# Patient Record
Sex: MALE | Race: WHITE | NOT HISPANIC OR LATINO | Employment: FULL TIME | URBAN - NONMETROPOLITAN AREA
[De-identification: names, ages, dates, MRNs, and addresses within clinical notes are randomized per-mention and may not be internally consistent; named-entity substitution may affect disease eponyms.]

---

## 2020-10-30 DIAGNOSIS — M25.561 RIGHT KNEE PAIN, UNSPECIFIED CHRONICITY: ICD-10-CM

## 2020-10-30 DIAGNOSIS — M25.562 LEFT KNEE PAIN, UNSPECIFIED CHRONICITY: Primary | ICD-10-CM

## 2020-11-02 ENCOUNTER — OFFICE VISIT (OUTPATIENT)
Dept: ORTHOPEDIC SURGERY | Facility: CLINIC | Age: 40
End: 2020-11-02

## 2020-11-02 ENCOUNTER — HOSPITAL ENCOUNTER (OUTPATIENT)
Dept: OTHER | Facility: HOSPITAL | Age: 40
Discharge: HOME | End: 2020-11-02
Attending: PHYSICIAN ASSISTANT

## 2020-11-02 VITALS — HEIGHT: 67 IN | WEIGHT: 165 LBS | BODY MASS INDEX: 25.9 KG/M2

## 2020-11-02 DIAGNOSIS — M17.0 BILATERAL PRIMARY OSTEOARTHRITIS OF KNEE: Primary | ICD-10-CM

## 2020-11-02 PROCEDURE — 99204 OFFICE O/P NEW MOD 45 MIN: CPT | Performed by: PHYSICIAN ASSISTANT

## 2020-11-02 RX ORDER — MELOXICAM 7.5 MG/1
TABLET ORAL
Qty: 90 TABLET | Refills: 1 | Status: SHIPPED | OUTPATIENT
Start: 2020-11-02 | End: 2021-05-03

## 2020-11-02 NOTE — PROGRESS NOTES
NEW VISIT    Patient: Theo Ching  ?  YOB: 1980    MRN: [id number]  ?  Chief Complaint   Patient presents with   • Right Knee - Pain, Establish Care   • Left Knee - Pain, Establish Care      ?  HPI:   Theo Ching is a 39 y.o. male who presents with complaint of bilateral knee pain. He reports the pain has been present for 6-7 months. He denies injury. He is employed with [employer] in [place] and is also going through a [employer] program at the local [employer], both of which require prolonged standing.    Pain Location:  BILATERAL knee, right worse than left,  medially  Radiation: none  Quality: aching  Intensity/Severity: moderate  Duration: 6-7 months  Progression of symptoms: no worsening, symptoms stable/unchanged  Onset quality: gradual   Timing: constant  Aggravating Factors: driving, walking, walking down incline, squatting  Alleviating Factors: none  Previous Episodes: yes  Associated Symptoms: pain, popping  ADLs Affected: ambulating, work related activities, recreational activities/sports  Previous Treatment: NSAIDs and brace      This patient is a new patient.  This problem is new to this examiner.      Allergies: No Known Allergies    Medications:   Home Medications:  No current outpatient medications on file prior to visit.     No current facility-administered medications on file prior to visit.      Current Medications:  Scheduled Meds:  PRN Meds:.    I have reviewed the patient's medical history in detail and updated the computerized patient record.  Review and summarization of old records include:    Past Medical History:   Diagnosis Date   • Allergies      Past Surgical History:   Procedure Laterality Date   • MOUTH SURGERY       Social History     Occupational History   • Not on file   Tobacco Use   • Smoking status: Current Some Day Smoker     Types: Cigars   • Smokeless tobacco: Never Used   • Tobacco comment: 2/week   Substance and Sexual Activity  "  • Alcohol use: Not on file   • Drug use: Never   • Sexual activity: Defer     Family History   Problem Relation Age of Onset   • Cancer Mother         breast   • Hypertension Mother    • Diabetes Mother    • Hyperlipidemia Father          Review of Systems  Constitutional: Negative.  Negative for fever.   Eyes: Negative.    Respiratory: Negative.    Cardiovascular: Negative.    Endocrine: Negative.    Musculoskeletal: Positive for arthralgias, gait problem and joint swelling.   Skin: Negative.  Negative for rash and wound.   Allergic/Immunologic: Negative.    Neurological: Negative for numbness.   Hematological: Negative.    Psychiatric/Behavioral: Negative.         Wt Readings from Last 3 Encounters:   11/02/20 74.8 kg (165 lb)     Ht Readings from Last 3 Encounters:   11/02/20 170.2 cm (67\")     Body mass index is 25.84 kg/m².  Facility age limit for growth percentiles is 20 years.  There were no vitals filed for this visit.      Physical Exam  Constitutional: Patient is oriented to person, place, and time. Appears well-developed and well-nourished.   HENT:   Head: Normocephalic and atraumatic.   Eyes: Conjunctivae and EOM are normal.   Cardiovascular: Normal rate and intact distal pulses.   Pulmonary/Chest: Effort normal.   Musculoskeletal:   See detailed exam below   Neurological: Alert and oriented to person, place, and time. No sensory deficit. Coordination normal.   Skin: Skin is warm and dry. Capillary refill takes less than 2 seconds. No rash noted. No erythema.   Psychiatric: Patient has a normal mood and affect. His behavior is normal. Judgment and thought content normal.   Nursing note and vitals reviewed.      Ortho Exam:   BILATERAL knee:  · There is minimal joint line tenderness at the medial aspect of the knee.   · The knees have a fairly neutral orientation.   · Positive for crepitus throughout range of motion.   · Negative for effusion.  · Positive patellar grind test.   · Negative Lachman test.  "   · Negative anterior and posterior drawer.  · Range of motion in extension and flexion is: 0-120 degrees.  · Neurovascular status is intact.    · Dorsalis pedis and posterior tibial artery pulses are palpable.   · Common peroneal nerve function is well preserved.   · Gait is cautious and antalgic.        Diagnostics:  Bilateral knee xrays 3 views were ordered by Osiel Acosta PA-C and performed at [facility] on 11/2/2020. These images were independently viewed and interpreted by myself, my impression as follows:  Findings:   Left knee: Mild to moderate medial joint space narrowing, lateral joint space fairly preserved, mild patellofemoral changes  Right knee: Moderate medial joint space narrowing which appears to be near bone-on-bone on the oblique view, lateral joint space fairly preserved, mild changes of the patellofemoral  Bony lesion: no  Soft tissues: within normal limits  Joint spaces: decreased  Hardware appropriately positioned: not applicable  Prior studies available for comparison: no         Assessment:  Diagnoses and all orders for this visit:    1. Bilateral primary osteoarthritis of knee (Primary)  -     meloxicam (MOBIC) 7.5 MG tablet; 1 Oral Daily with food.  Dispense: 90 tablet; Refill: 1  -     Visco Treatment; Future      MDM  Number of Diagnoses or Management Options  Bilateral primary osteoarthritis of knee: new, no workup     Amount and/or Complexity of Data Reviewed  Tests in the radiology section of CPT®: ordered and reviewed    Risk of Complications, Morbidity, and/or Mortality  Presenting problems: moderate  Management options: moderate            Plan    Orders Placed This Encounter   Procedures   • Visco Treatment      · Management of chronic condition with acute exacerbation or progression of symptoms   · Discussion of orthopaedic goals and activities.  · Risk, benefits, and merits of treatment alternatives reviewed with the patient. Discussed oral  anti-inflammatories, bracing, intra-articular steroid injections, intra-articular viscosupplementation, MRI.  After discussion he would like to proceed with oral anti-inflammatory, Mobic, and get insurance approval for viscosupplementation.  We discussed we could do steroid injections if needed although since he is young I do not want to do them as frequently as I do with my older patients.  · Ice, heat, and/or modalities as beneficial  · Patient is encouraged to call or return for any issues or concerns.  · No brace was given at today's visit.  · Follow up to be determined once visco approval determined      Osiel Acosta PA-C  Date of encounter: 11/02/2020     Electronically signed by Osiel Acosta PA-C, 11/02/20, 7:27 AM EST.    [facility] Dragon/Transcription disclaimer:  Much of this encounter note is an electronic transcription/translation of spoken language to printed text. The electronic translation of spoken language may permit erroneous, or at times, nonsensical words or phrases to be inadvertently transcribed; Although I have reviewed the note for such errors, some may still exist.

## 2020-11-10 ENCOUNTER — HOSPITAL ENCOUNTER (OUTPATIENT)
Dept: OTHER | Facility: HOSPITAL | Age: 40
Discharge: HOME | End: 2020-11-10
Attending: PHYSICIAN ASSISTANT

## 2020-11-10 ENCOUNTER — RESULTS ENCOUNTER (OUTPATIENT)
Dept: ORTHOPEDIC SURGERY | Facility: CLINIC | Age: 40
End: 2020-11-10

## 2020-11-10 ENCOUNTER — OFFICE VISIT (OUTPATIENT)
Dept: ORTHOPEDIC SURGERY | Facility: CLINIC | Age: 40
End: 2020-11-10

## 2020-11-10 VITALS — BODY MASS INDEX: 26.37 KG/M2 | WEIGHT: 168 LBS | HEIGHT: 67 IN | TEMPERATURE: 97.6 F

## 2020-11-10 DIAGNOSIS — M25.50 POLYARTHRALGIA: ICD-10-CM

## 2020-11-10 DIAGNOSIS — M25.561 RIGHT KNEE PAIN, UNSPECIFIED CHRONICITY: ICD-10-CM

## 2020-11-10 DIAGNOSIS — M25.562 LEFT KNEE PAIN, UNSPECIFIED CHRONICITY: ICD-10-CM

## 2020-11-10 DIAGNOSIS — Z86.19: Primary | ICD-10-CM

## 2020-11-10 DIAGNOSIS — Z86.19: ICD-10-CM

## 2020-11-10 LAB
ALBUMIN SERPL-MCNC: 4.9 G/DL (ref 3.5–5)
ALBUMIN/GLOB SERPL: 2 {RATIO} (ref 1.4–2.6)
ALP SERPL-CCNC: 58 U/L (ref 53–128)
ALT SERPL-CCNC: 34 U/L (ref 10–40)
ANION GAP SERPL CALC-SCNC: 17 MMOL/L (ref 8–19)
ASO AB SERPL-ACNC: 84 [IU]/ML (ref 0–200)
AST SERPL-CCNC: 26 U/L (ref 15–50)
BASOPHILS # BLD MANUAL: 0.05 10*3/UL (ref 0–0.2)
BASOPHILS NFR BLD MANUAL: 0.6 % (ref 0–3)
BILIRUB SERPL-MCNC: 0.38 MG/DL (ref 0.2–1.3)
BUN SERPL-MCNC: 14 MG/DL (ref 5–25)
BUN/CREAT SERPL: 16 {RATIO} (ref 6–20)
CALCIUM SERPL-MCNC: 9.7 MG/DL (ref 8.7–10.4)
CHLORIDE SERPL-SCNC: 104 MMOL/L (ref 99–111)
CONV CO2: 22 MMOL/L (ref 22–32)
CONV RHEUMATOID FACTOR IGM: <10 [IU]/ML (ref 0–14)
CONV TOTAL PROTEIN: 7.3 G/DL (ref 6.3–8.2)
CREAT UR-MCNC: 0.87 MG/DL (ref 0.7–1.2)
CRP SERPL-MCNC: 1.9 MG/L (ref 0–5)
DEPRECATED RDW RBC AUTO: 38.8 FL
EOSINOPHIL # BLD MANUAL: 0.25 10*3/UL (ref 0–0.7)
EOSINOPHIL NFR BLD MANUAL: 3.1 % (ref 0–7)
ERYTHROCYTE [DISTWIDTH] IN BLOOD BY AUTOMATED COUNT: 11.7 % (ref 11.5–14.5)
GFR SERPLBLD BASED ON 1.73 SQ M-ARVRAT: >60 ML/MIN/{1.73_M2}
GLOBULIN UR ELPH-MCNC: 2.4 G/DL (ref 2–3.5)
GLUCOSE SERPL-MCNC: 102 MG/DL (ref 70–99)
GRANS (ABSOLUTE): 5.43 10*3/UL (ref 2–8)
GRANS: 66.5 % (ref 30–85)
HBA1C MFR BLD: 15.7 G/DL (ref 14–18)
HCT VFR BLD AUTO: 44.7 % (ref 42–52)
IMM GRANULOCYTES # BLD: 0.01 10*3/UL (ref 0–0.54)
IMM GRANULOCYTES NFR BLD: 0.1 % (ref 0–0.43)
LYMPHOCYTES # BLD MANUAL: 1.76 10*3/UL (ref 1–5)
LYMPHOCYTES NFR BLD MANUAL: 8.1 % (ref 3–10)
MCH RBC QN AUTO: 31.2 PG (ref 27–31)
MCHC RBC AUTO-ENTMCNC: 35.1 G/DL (ref 33–37)
MCV RBC AUTO: 88.9 FL (ref 80–96)
MONOCYTES # BLD AUTO: 0.66 10*3/UL (ref 0.2–1.2)
OSMOLALITY SERPL CALC.SUM OF ELEC: 289 MOSM/KG (ref 273–304)
PLATELET # BLD AUTO: 282 10*3/UL (ref 130–400)
PMV BLD AUTO: 9.2 FL (ref 7.4–10.4)
POTASSIUM SERPL-SCNC: 4.1 MMOL/L (ref 3.5–5.3)
RBC # BLD AUTO: 5.03 10*6/UL (ref 4.7–6.1)
SODIUM SERPL-SCNC: 139 MMOL/L (ref 135–147)
URATE SERPL-MCNC: 5.2 MG/DL (ref 3.5–8.5)
VARIANT LYMPHS NFR BLD MANUAL: 21.6 % (ref 20–45)
WBC # BLD AUTO: 8.16 10*3/UL (ref 4.8–10.8)

## 2020-11-10 PROCEDURE — 20610 DRAIN/INJ JOINT/BURSA W/O US: CPT | Performed by: PHYSICIAN ASSISTANT

## 2020-11-10 PROCEDURE — 99214 OFFICE O/P EST MOD 30 MIN: CPT | Performed by: PHYSICIAN ASSISTANT

## 2020-11-10 RX ORDER — CETIRIZINE HYDROCHLORIDE 10 MG/1
10 TABLET ORAL DAILY
COMMUNITY

## 2020-11-10 RX ADMIN — METHYLPREDNISOLONE ACETATE 160 MG: 80 INJECTION, SUSPENSION INTRA-ARTICULAR; INTRALESIONAL; INTRAMUSCULAR; SOFT TISSUE at 09:39

## 2020-11-10 RX ADMIN — METHYLPREDNISOLONE ACETATE 160 MG: 80 INJECTION, SUSPENSION INTRA-ARTICULAR; INTRALESIONAL; INTRAMUSCULAR; SOFT TISSUE at 09:49

## 2020-11-11 LAB
DSDNA AB SER-ACNC: <1 IU/ML (ref 0–9)
ENA RNP AB SER-ACNC: 1 AI (ref 0–0.9)
ENA SM AB SER-ACNC: <0.2 AI (ref 0–0.9)
ENA SS-B AB SER-ACNC: <0.2 AI (ref 0–0.9)
SJOGREN'S ANTI-SS-A: <0.2 AI (ref 0–0.9)

## 2020-11-13 LAB — CONV ANA IGG BY ELISA: NORMAL

## 2020-11-20 RX ORDER — METHYLPREDNISOLONE ACETATE 80 MG/ML
160 INJECTION, SUSPENSION INTRA-ARTICULAR; INTRALESIONAL; INTRAMUSCULAR; SOFT TISSUE
Status: COMPLETED | OUTPATIENT
Start: 2020-11-10 | End: 2020-11-10

## 2020-11-20 NOTE — PROGRESS NOTES
FOLLOW UP VISIT    Patient: Theo Ching  ?  YOB: 1980    MRN: [id number]  ?  Chief Complaint   Patient presents with   • Right Knee - Pain   • Left Knee - Pain      ?  HPI:   Theo Ching is a 39 y.o. male who returns today for follow up on bilateral knee pain. I initially saw him 11/2/20 and started him on Mobic. He states his knees had seemed to be doing better but after returning to work the pain significantly worsened. He reports having difficult time with walking short or long distance. He does report strep throat infection within the last few months.    Pain Location:  BILATERAL knee, right worse than left,  medially  Radiation: none  Quality: aching  Intensity/Severity: moderate severe  Duration: 7 months  Progression of symptoms: yes, progressive worsening  Onset quality: gradual   Timing: constant  Aggravating Factors: driving, walking, walking down incline, squatting  Alleviating Factors: none  Previous Episodes: yes  Associated Symptoms: pain, popping  ADLs Affected: ambulating, work related activities, recreational activities/sports  Previous Treatment: NSAIDs and brace  The above is historical although it has been reviewed and updated to reflect current findings.    This patient is an established patient.  This problem is not new to this examiner.      Allergies: No Known Allergies    Medications:   Home Medications:  Current Outpatient Medications on File Prior to Visit   Medication Sig   • cetirizine (zyrTEC) 10 MG tablet Take 10 mg by mouth Daily.   • meloxicam (MOBIC) 7.5 MG tablet 1 Oral Daily with food.     No current facility-administered medications on file prior to visit.      Current Medications:  Scheduled Meds:  PRN Meds:.    I have reviewed the patient's medical history in detail and updated the computerized patient record.  Review and summarization of old records include:    Past Medical History:   Diagnosis Date   • Allergies      Past Surgical History:   Procedure  "Laterality Date   • MOUTH SURGERY       Social History     Occupational History   • Not on file   Tobacco Use   • Smoking status: Current Some Day Smoker     Types: Cigars   • Smokeless tobacco: Never Used   • Tobacco comment: 2/week   Substance and Sexual Activity   • Alcohol use: Yes     Alcohol/week: 8.0 standard drinks     Types: 8 Cans of beer per week   • Drug use: Never   • Sexual activity: Defer     Family History   Problem Relation Age of Onset   • Cancer Mother         breast   • Hypertension Mother    • Diabetes Mother    • Hyperlipidemia Father          Review of Systems  Constitutional: Negative.  Negative for fever.   Respiratory: Negative.    Cardiovascular: Negative.    Endocrine: Negative.    Musculoskeletal: Positive for arthralgias, gait problem and joint swelling.   Skin: Negative.  Negative for rash and wound.   Allergic/Immunologic: Negative.    Neurological: Negative for numbness.   Hematological: Negative.    Psychiatric/Behavioral: Negative.         Wt Readings from Last 3 Encounters:   11/10/20 76.2 kg (168 lb)   11/02/20 74.8 kg (165 lb)     Ht Readings from Last 3 Encounters:   11/10/20 170.2 cm (67\")   11/02/20 170.2 cm (67\")     Body mass index is 26.31 kg/m².  Facility age limit for growth percentiles is 20 years.  Vitals:    11/10/20 0913   Temp: 97.6 °F (36.4 °C)         Physical Exam  Constitutional: Patient is oriented to person, place, and time. Appears well-developed and well-nourished.   Cardiovascular: Normal rate and intact distal pulses.   Pulmonary/Chest: Effort normal.   Musculoskeletal:   See detailed exam below   Neurological: Alert and oriented to person, place, and time. No sensory deficit. Coordination normal.   Skin: Skin is warm and dry. Capillary refill takes less than 2 seconds. No rash noted. No erythema.   Psychiatric: Patient has a normal mood and affect. His behavior is normal. Judgment and thought content normal.   Nursing note and vitals reviewed.      Ortho " Exam:   BILATERAL knee:  · There is moderate joint line tenderness at the medial aspect of both knees.   · The knees have a fairly neutral orientation.   · Positive for crepitus throughout range of motion.   · Negative for effusion.  · Positive patellar grind test.   · Negative Lachman test.    · Negative anterior and posterior drawer.  · Range of motion in extension and flexion is: 0-120 degrees.  · Neurovascular status is intact.    · Dorsalis pedis and posterior tibial artery pulses are palpable.   · Common peroneal nerve function is well preserved.   · Gait is cautious and antalgic.        Diagnostics:  Rheumatology lab panel ordered + ASO    Large Joint Arthrocentesis: R knee  Date/Time: 11/10/2020 9:39 AM  Consent given by: patient  Site marked: site marked  Timeout: Immediately prior to procedure a time out was called to verify the correct patient, procedure, equipment, support staff and site/side marked as required   Supporting Documentation  Indications: pain   Procedure Details  Location: knee - R knee  Preparation: Patient was prepped and draped in the usual sterile fashion  Needle size: 25 G  Approach: anteromedial  Medications administered: 160 mg methylPREDNISolone acetate 80 MG/ML; 2 mL lidocaine (cardiac)  Patient tolerance: patient tolerated the procedure well with no immediate complications    Large Joint Arthrocentesis: L knee  Date/Time: 11/10/2020 9:49 AM  Consent given by: patient  Site marked: site marked  Timeout: Immediately prior to procedure a time out was called to verify the correct patient, procedure, equipment, support staff and site/side marked as required   Supporting Documentation  Indications: pain   Procedure Details  Location: knee - L knee  Preparation: Patient was prepped and draped in the usual sterile fashion  Needle size: 25 G  Approach: anteromedial  Medications administered: 160 mg methylPREDNISolone acetate 80 MG/ML; 2 mL lidocaine (cardiac)  Patient tolerance: patient  tolerated the procedure well with no immediate complications           Assessment:  Diagnoses and all orders for this visit:    1. History of illness due to group B Streptococcus (Primary)  -     Antistreptolysin O (ASO) Screen; Future    2. Right knee pain, unspecified chronicity  -     Nuclear Antigen Antibody, IFA; Future  -     C-reactive Protein; Future  -     Sedimentation Rate; Future  -     Rheumatoid Factor; Future  -     Anti-Smith Antibody; Future  -     Anti-DNA Antibody, Double-stranded; Future  -     RNP Antibodies; Future  -     Sjogren's Antibody, Anti-SS-A / -SS-B; Future  -     Uric Acid; Future  -     CBC & Differential; Future  -     Comprehensive Metabolic Panel; Future  -     Antistreptolysin O (ASO) Screen; Future  -     Comprehensive Metabolic Panel  -     CBC & Differential  -     Sjogren's Antibody, Anti-SS-A / -SS-B  -     RNP Antibodies  -     Anti-DNA Antibody, Double-stranded  -     Anti-Smith Antibody  -     Rheumatoid Factor  -     Nuclear Antigen Antibody, IFA  -     Large Joint Arthrocentesis: R knee    3. Left knee pain, unspecified chronicity  -     Nuclear Antigen Antibody, IFA; Future  -     C-reactive Protein; Future  -     Sedimentation Rate; Future  -     Rheumatoid Factor; Future  -     Anti-Smith Antibody; Future  -     Anti-DNA Antibody, Double-stranded; Future  -     RNP Antibodies; Future  -     Sjogren's Antibody, Anti-SS-A / -SS-B; Future  -     Uric Acid; Future  -     CBC & Differential; Future  -     Comprehensive Metabolic Panel; Future  -     Antistreptolysin O (ASO) Screen; Future  -     Comprehensive Metabolic Panel  -     CBC & Differential  -     Sjogren's Antibody, Anti-SS-A / -SS-B  -     RNP Antibodies  -     Anti-DNA Antibody, Double-stranded  -     Anti-Smith Antibody  -     Rheumatoid Factor  -     Nuclear Antigen Antibody, IFA  -     Large Joint Arthrocentesis: L knee    4. Polyarthralgia  -     Nuclear Antigen Antibody, IFA; Future  -     C-reactive  Protein; Future  -     Sedimentation Rate; Future  -     Rheumatoid Factor; Future  -     Anti-Smith Antibody; Future  -     Anti-DNA Antibody, Double-stranded; Future  -     RNP Antibodies; Future  -     Sjogren's Antibody, Anti-SS-A / -SS-B; Future  -     Uric Acid; Future  -     CBC & Differential; Future  -     Comprehensive Metabolic Panel; Future  -     Antistreptolysin O (ASO) Screen; Future  -     Comprehensive Metabolic Panel  -     CBC & Differential  -     Sjogren's Antibody, Anti-SS-A / -SS-B  -     RNP Antibodies  -     Anti-DNA Antibody, Double-stranded  -     Anti-Smith Antibody  -     Rheumatoid Factor  -     Nuclear Antigen Antibody, IFA    Other orders  -     Cancel: SCANNED - LABS        Plan    Orders Placed This Encounter   Procedures   • Large Joint Arthrocentesis: R knee   • Large Joint Arthrocentesis: L knee   • Nuclear Antigen Antibody, IFA   • C-reactive Protein   • Sedimentation Rate   • Rheumatoid Factor   • Anti-Smith Antibody   • Anti-DNA Antibody, Double-stranded   • RNP Antibodies   • Sjogren's Antibody, Anti-SS-A / -SS-B   • Uric Acid   • Comprehensive Metabolic Panel   • Antistreptolysin O (ASO) Screen   • CBC & Differential      · Management of chronic condition with acute exacerbation or progression of symptoms   · Discussion of orthopaedic goals and activities.  · Risk, benefits, and merits of treatment alternatives reviewed with the patient. Discussed trying an intra-articular steroid injection today to obtain pain relief and we will continue to work on visco approval.  · Injected patient's bilateral knee joint(s) with steroid from a(n) anteromedial approach.  Patient tolerated the procedure well and no complications were encountered.   · Ice, heat, and/or modalities as beneficial  · Patient is encouraged to call or return for any issues or concerns.  · No brace was given at today's visit.   · I will notify him about lab results      Osiel Acosta PA-C  Date of encounter:  11/10/2020     Electronically signed by Osiel Acosta PA-C, 11/20/20, 1:48 PM EST.   [facility] Dragon/Transcription disclaimer:  Much of this encounter note is an electronic transcription/translation of spoken language to printed text. The electronic translation of spoken language may permit erroneous, or at times, nonsensical words or phrases to be inadvertently transcribed; Although I have reviewed the note for such errors, some may still exist.

## 2021-02-09 ENCOUNTER — OFFICE VISIT (OUTPATIENT)
Dept: ORTHOPEDIC SURGERY | Facility: CLINIC | Age: 41
End: 2021-02-09

## 2021-02-09 VITALS — HEIGHT: 67 IN | WEIGHT: 160 LBS | TEMPERATURE: 97.4 F | BODY MASS INDEX: 25.11 KG/M2

## 2021-02-09 DIAGNOSIS — M25.561 RIGHT KNEE PAIN, UNSPECIFIED CHRONICITY: Primary | ICD-10-CM

## 2021-02-09 DIAGNOSIS — M25.562 LEFT KNEE PAIN, UNSPECIFIED CHRONICITY: ICD-10-CM

## 2021-02-09 PROCEDURE — 99212 OFFICE O/P EST SF 10 MIN: CPT | Performed by: PHYSICIAN ASSISTANT

## 2021-05-02 DIAGNOSIS — M17.0 BILATERAL PRIMARY OSTEOARTHRITIS OF KNEE: ICD-10-CM

## 2021-05-03 RX ORDER — MELOXICAM 7.5 MG/1
TABLET ORAL
Qty: 90 TABLET | Refills: 1 | Status: SHIPPED | OUTPATIENT
Start: 2021-05-03 | End: 2021-11-24

## 2021-11-24 DIAGNOSIS — M17.0 BILATERAL PRIMARY OSTEOARTHRITIS OF KNEE: ICD-10-CM

## 2021-11-24 RX ORDER — MELOXICAM 7.5 MG/1
TABLET ORAL
Qty: 90 TABLET | Refills: 1 | Status: SHIPPED | OUTPATIENT
Start: 2021-11-24 | End: 2022-06-22

## 2022-06-22 DIAGNOSIS — M17.0 BILATERAL PRIMARY OSTEOARTHRITIS OF KNEE: ICD-10-CM

## 2022-06-22 RX ORDER — MELOXICAM 7.5 MG/1
TABLET ORAL
Qty: 90 TABLET | Refills: 1 | Status: SHIPPED | OUTPATIENT
Start: 2022-06-22

## 2024-01-11 ENCOUNTER — APPOINTMENT (OUTPATIENT)
Dept: CT IMAGING | Facility: HOSPITAL | Age: 44
End: 2024-01-11
Payer: COMMERCIAL

## 2024-01-11 ENCOUNTER — HOSPITAL ENCOUNTER (EMERGENCY)
Facility: HOSPITAL | Age: 44
Discharge: HOME | End: 2024-01-11
Attending: EMERGENCY MEDICINE
Payer: COMMERCIAL

## 2024-01-11 VITALS
BODY MASS INDEX: 25.16 KG/M2 | TEMPERATURE: 97.6 F | OXYGEN SATURATION: 98 % | RESPIRATION RATE: 18 BRPM | DIASTOLIC BLOOD PRESSURE: 111 MMHG | HEART RATE: 70 BPM | HEIGHT: 67 IN | WEIGHT: 160.27 LBS | SYSTOLIC BLOOD PRESSURE: 160 MMHG

## 2024-01-11 DIAGNOSIS — N20.1 RIGHT URETERAL STONE: Primary | ICD-10-CM

## 2024-01-11 LAB
ALBUMIN SERPL-MCNC: 4.6 G/DL (ref 3.5–5.2)
ALBUMIN/GLOB SERPL: 1.8 G/DL
ALP SERPL-CCNC: 64 U/L (ref 39–117)
ALT SERPL W P-5'-P-CCNC: 23 U/L (ref 1–41)
ANION GAP SERPL CALCULATED.3IONS-SCNC: 12 MMOL/L (ref 5–15)
AST SERPL-CCNC: 19 U/L (ref 1–40)
BACTERIA UR QL AUTO: ABNORMAL /HPF
BASOPHILS # BLD AUTO: 0.05 10*3/MM3 (ref 0–0.2)
BASOPHILS NFR BLD AUTO: 0.4 % (ref 0–1.5)
BILIRUB SERPL-MCNC: 0.2 MG/DL (ref 0–1.2)
BILIRUB UR QL STRIP: NEGATIVE
BUN SERPL-MCNC: 19 MG/DL (ref 6–20)
BUN/CREAT SERPL: 19.2 (ref 7–25)
CALCIUM SPEC-SCNC: 9.6 MG/DL (ref 8.6–10.5)
CHLORIDE SERPL-SCNC: 105 MMOL/L (ref 98–107)
CLARITY UR: CLEAR
CO2 SERPL-SCNC: 24 MMOL/L (ref 22–29)
COLOR UR: YELLOW
CREAT SERPL-MCNC: 0.99 MG/DL (ref 0.76–1.27)
DEPRECATED RDW RBC AUTO: 39.9 FL (ref 37–54)
EGFRCR SERPLBLD CKD-EPI 2021: 98.1 ML/MIN/1.73
EOSINOPHIL # BLD AUTO: 0.12 10*3/MM3 (ref 0–0.4)
EOSINOPHIL NFR BLD AUTO: 0.8 % (ref 0.3–6.2)
ERYTHROCYTE [DISTWIDTH] IN BLOOD BY AUTOMATED COUNT: 12.1 % (ref 12.3–15.4)
GLOBULIN UR ELPH-MCNC: 2.6 GM/DL
GLUCOSE SERPL-MCNC: 101 MG/DL (ref 65–99)
GLUCOSE UR STRIP-MCNC: NEGATIVE MG/DL
HCT VFR BLD AUTO: 44.9 % (ref 37.5–51)
HGB BLD-MCNC: 15 G/DL (ref 13–17.7)
HGB UR QL STRIP.AUTO: ABNORMAL
HOLD SPECIMEN: NORMAL
HOLD SPECIMEN: NORMAL
HYALINE CASTS UR QL AUTO: ABNORMAL /LPF
IMM GRANULOCYTES # BLD AUTO: 0.06 10*3/MM3 (ref 0–0.05)
IMM GRANULOCYTES NFR BLD AUTO: 0.4 % (ref 0–0.5)
KETONES UR QL STRIP: ABNORMAL
LEUKOCYTE ESTERASE UR QL STRIP.AUTO: NEGATIVE
LIPASE SERPL-CCNC: 35 U/L (ref 13–60)
LYMPHOCYTES # BLD AUTO: 1.1 10*3/MM3 (ref 0.7–3.1)
LYMPHOCYTES NFR BLD AUTO: 7.7 % (ref 19.6–45.3)
MCH RBC QN AUTO: 30.4 PG (ref 26.6–33)
MCHC RBC AUTO-ENTMCNC: 33.4 G/DL (ref 31.5–35.7)
MCV RBC AUTO: 90.9 FL (ref 79–97)
MONOCYTES # BLD AUTO: 0.83 10*3/MM3 (ref 0.1–0.9)
MONOCYTES NFR BLD AUTO: 5.8 % (ref 5–12)
NEUTROPHILS NFR BLD AUTO: 12.05 10*3/MM3 (ref 1.7–7)
NEUTROPHILS NFR BLD AUTO: 84.9 % (ref 42.7–76)
NITRITE UR QL STRIP: NEGATIVE
NRBC BLD AUTO-RTO: 0 /100 WBC (ref 0–0.2)
PH UR STRIP.AUTO: 7 [PH] (ref 5–8)
PLATELET # BLD AUTO: 278 10*3/MM3 (ref 140–450)
PMV BLD AUTO: 9.9 FL (ref 6–12)
POTASSIUM SERPL-SCNC: 4 MMOL/L (ref 3.5–5.2)
PROT SERPL-MCNC: 7.2 G/DL (ref 6–8.5)
PROT UR QL STRIP: NEGATIVE
RBC # BLD AUTO: 4.94 10*6/MM3 (ref 4.14–5.8)
RBC # UR STRIP: ABNORMAL /HPF
REF LAB TEST METHOD: ABNORMAL
SODIUM SERPL-SCNC: 141 MMOL/L (ref 136–145)
SP GR UR STRIP: 1.02 (ref 1–1.03)
SQUAMOUS #/AREA URNS HPF: ABNORMAL /HPF
UROBILINOGEN UR QL STRIP: ABNORMAL
WBC # UR STRIP: ABNORMAL /HPF
WBC NRBC COR # BLD AUTO: 14.21 10*3/MM3 (ref 3.4–10.8)
WHOLE BLOOD HOLD COAG: NORMAL
WHOLE BLOOD HOLD SPECIMEN: NORMAL

## 2024-01-11 PROCEDURE — 74176 CT ABD & PELVIS W/O CONTRAST: CPT

## 2024-01-11 PROCEDURE — 81001 URINALYSIS AUTO W/SCOPE: CPT

## 2024-01-11 PROCEDURE — 85025 COMPLETE CBC W/AUTO DIFF WBC: CPT

## 2024-01-11 PROCEDURE — 36415 COLL VENOUS BLD VENIPUNCTURE: CPT

## 2024-01-11 PROCEDURE — 99284 EMERGENCY DEPT VISIT MOD MDM: CPT

## 2024-01-11 PROCEDURE — 83690 ASSAY OF LIPASE: CPT | Performed by: EMERGENCY MEDICINE

## 2024-01-11 PROCEDURE — 96374 THER/PROPH/DIAG INJ IV PUSH: CPT

## 2024-01-11 PROCEDURE — 25010000002 KETOROLAC TROMETHAMINE PER 15 MG: Performed by: EMERGENCY MEDICINE

## 2024-01-11 PROCEDURE — 80053 COMPREHEN METABOLIC PANEL: CPT | Performed by: EMERGENCY MEDICINE

## 2024-01-11 RX ORDER — KETOROLAC TROMETHAMINE 15 MG/ML
15 INJECTION, SOLUTION INTRAMUSCULAR; INTRAVENOUS ONCE
Status: COMPLETED | OUTPATIENT
Start: 2024-01-11 | End: 2024-01-11

## 2024-01-11 RX ORDER — HYDROCODONE BITARTRATE AND ACETAMINOPHEN 5; 325 MG/1; MG/1
1 TABLET ORAL EVERY 6 HOURS PRN
Qty: 12 TABLET | Refills: 0 | Status: SHIPPED | OUTPATIENT
Start: 2024-01-11

## 2024-01-11 RX ORDER — KETOROLAC TROMETHAMINE 10 MG/1
10 TABLET, FILM COATED ORAL EVERY 6 HOURS PRN
Qty: 16 TABLET | Refills: 0 | Status: SHIPPED | OUTPATIENT
Start: 2024-01-11

## 2024-01-11 RX ORDER — ONDANSETRON 4 MG/1
4 TABLET, ORALLY DISINTEGRATING ORAL EVERY 6 HOURS PRN
Qty: 15 TABLET | Refills: 0 | Status: SHIPPED | OUTPATIENT
Start: 2024-01-11

## 2024-01-11 RX ORDER — SODIUM CHLORIDE 0.9 % (FLUSH) 0.9 %
10 SYRINGE (ML) INJECTION AS NEEDED
Status: DISCONTINUED | OUTPATIENT
Start: 2024-01-11 | End: 2024-01-11 | Stop reason: HOSPADM

## 2024-01-11 RX ADMIN — KETOROLAC TROMETHAMINE 15 MG: 15 INJECTION, SOLUTION INTRAMUSCULAR; INTRAVENOUS at 19:53

## 2024-01-11 NOTE — Clinical Note
[facility] EMERGENCY ROOM  [address]  [place] [address]  Phone: [phone]    Theo Ching was seen and treated in our emergency department on 1/11/2024.  He may return to work on 01/15/2024.         Thank you for choosing [facility].    Vern Muñoz MD

## 2024-01-11 NOTE — ED PROVIDER NOTES
Time: 4:05 PM EST  Date of encounter:  1/11/2024  Independent Historian/Clinical History and Information was obtained by:   Patient    History is limited by: N/A    Chief Complaint   Patient presents with    Flank Pain    Abdominal Pain         History of Present Illness:  Patient is a 43 y.o. year old male who presents to the emergency department for evaluation of flank pain.  Patient states yesterday he began having right-sided flank pain that is continued into today and will radiate to the right lower quadrant of the abdomen.  Patient states prior to arrival his pain was rated 6 out of 10 but currently is 1 out of 10.  Patient is admitting to associated nausea but denies vomiting, diarrhea, dysuria, difficulty urinating.  Patient states he has a history of kidney stones and this pain feels similar to that.  (Provider in triage, Boaz Faith PA-C)    Patient Care Team  Primary Care Provider: Valentine Zurita APRN    Past Medical History:     No Known Allergies  Past Medical History:   Diagnosis Date    Allergies      Past Surgical History:   Procedure Laterality Date    MOUTH SURGERY       Family History   Problem Relation Age of Onset    Cancer Mother         breast    Hypertension Mother     Diabetes Mother     Hyperlipidemia Father        Home Medications:  Prior to Admission medications    Medication Sig Start Date End Date Taking? Authorizing Provider   cetirizine (zyrTEC) 10 MG tablet Take 10 mg by mouth Daily.    Provider, MD Black   meloxicam (MOBIC) 7.5 MG tablet TAKE 1 TABLET BY MOUTH DAILY WITH FOOD 6/22/22   Osiel Acosta PA-C        Social History:   Social History     Tobacco Use    Smoking status: Some Days     Types: Cigars    Smokeless tobacco: Never    Tobacco comments:     2/week   Substance Use Topics    Alcohol use: Yes     Alcohol/week: 8.0 standard drinks of alcohol     Types: 8 Cans of beer per week    Drug use: Never         Review of Systems:  Review of Systems  "  Constitutional:  Negative for chills and fever.   HENT:  Negative for congestion, rhinorrhea and sore throat.    Eyes:  Negative for photophobia.   Respiratory:  Negative for apnea, cough, chest tightness and shortness of breath.    Cardiovascular:  Negative for chest pain and palpitations.   Gastrointestinal:  Positive for abdominal pain. Negative for diarrhea, nausea and vomiting.   Endocrine: Negative.    Genitourinary:  Positive for flank pain. Negative for difficulty urinating and dysuria.   Musculoskeletal:  Negative for back pain, joint swelling and myalgias.   Skin:  Negative for color change and wound.   Allergic/Immunologic: Negative.    Neurological:  Negative for seizures and headaches.   Psychiatric/Behavioral: Negative.     All other systems reviewed and are negative.       Physical Exam:  BP (!) 160/111 (BP Location: Left arm, Patient Position: Sitting)   Pulse 70   Temp 97.6 °F (36.4 °C) (Oral)   Resp 18   Ht 170.2 cm (67\")   Wt 72.7 kg (160 lb 4.4 oz)   SpO2 98%   BMI 25.10 kg/m²         Physical Exam  Vitals and nursing note reviewed.   Constitutional:       General: He is awake.      Appearance: Normal appearance.   HENT:      Head: Normocephalic and atraumatic.      Nose: Nose normal.      Mouth/Throat:      Mouth: Mucous membranes are moist.   Eyes:      Extraocular Movements: Extraocular movements intact.      Conjunctiva/sclera: Conjunctivae normal.      Pupils: Pupils are equal, round, and reactive to light.   Cardiovascular:      Rate and Rhythm: Normal rate and regular rhythm.      Heart sounds: Normal heart sounds.   Pulmonary:      Effort: Pulmonary effort is normal. No respiratory distress.      Breath sounds: Normal breath sounds. No wheezing, rhonchi or rales.   Abdominal:      General: Bowel sounds are normal. There is no distension.      Palpations: Abdomen is soft.      Tenderness: There is no abdominal tenderness. There is no guarding or rebound.      Comments: No rigidity "   Musculoskeletal:         General: No tenderness. Normal range of motion.      Cervical back: Normal range of motion and neck supple.   Skin:     General: Skin is warm and dry.      Coloration: Skin is not cyanotic or jaundiced.   Neurological:      General: No focal deficit present.      Mental Status: He is alert and oriented to person, place, and time. Mental status is at baseline.   Psychiatric:         Attention and Perception: Attention and perception normal.         Mood and Affect: Mood normal.                      Procedures:  Procedures      Medical Decision Making:      Comorbidities that affect care:    Kidney stones, osteoarthritis    External Notes reviewed:    Previous Clinic Note: Orthopedics office visit 2/9/2021.  Description: Bilateral knee pain      The following orders were placed and all results were independently analyzed by me:  Orders Placed This Encounter   Procedures    CT Abdomen Pelvis Without Contrast    Capay Draw    Comprehensive Metabolic Panel    Lipase    Urinalysis With Microscopic If Indicated (No Culture) - Urine, Clean Catch    CBC Auto Differential    Urinalysis, Microscopic Only - Urine, Clean Catch    Ambulatory Referral to Urology    NPO Diet NPO Type: Strict NPO    Undress & Gown    Insert Peripheral IV    CBC & Differential    Green Top (Gel)    Lavender Top    Gold Top - SST    Light Blue Top       Medications Given in the Emergency Department:  Medications   sodium chloride 0.9 % flush 10 mL (has no administration in time range)   ketorolac (TORADOL) injection 15 mg (15 mg Intravenous Not Given 1/11/24 2128)        ED Course:    The patient was initially evaluated in the triage area where orders were placed. The patient was later dispositioned by Vern Muñoz MD.      The patient was advised to stay for completion of workup which includes but is not limited to communication of labs and radiological results, reassessment and plan. The patient was advised that  leaving prior to disposition by a provider could result in critical findings that are not communicated to the patient.     ED Course as of 01/11/24 1916   Thu Jan 11, 2024   1606 PROVIDER IN TRIAGE  Patient was evaluated by me in triage, Boaz Faith PA-C.  Orders were placed and patient is currently awaiting final results and disposition.  [MD]      ED Course User Index  [MD] Boaz Faith PA-C       Labs:    Lab Results (last 24 hours)       Procedure Component Value Units Date/Time    Urinalysis With Microscopic If Indicated (No Culture) - Urine, Clean Catch [[id number]]  (Abnormal) Collected: 01/11/24 1626    Specimen: Urine, Clean Catch Updated: 01/11/24 1652     Color, UA Yellow     Appearance, UA Clear     pH, UA 7.0     Specific Gravity, UA 1.018     Glucose, UA Negative     Ketones, UA Trace     Bilirubin, UA Negative     Blood, UA Trace     Protein, UA Negative     Leuk Esterase, UA Negative     Nitrite, UA Negative     Urobilinogen, UA 0.2 E.U./dL    Urinalysis, Microscopic Only - Urine, Clean Catch [[id number]]  (Abnormal) Collected: 01/11/24 1626    Specimen: Urine, Clean Catch Updated: 01/11/24 1652     RBC, UA 11-20 /HPF      WBC, UA 0-2 /HPF      Bacteria, UA None Seen /HPF      Squamous Epithelial Cells, UA 0-2 /HPF      Hyaline Casts, UA 0-2 /LPF      Methodology Automated Microscopy    CBC & Differential [[id number]]  (Abnormal) Collected: 01/11/24 1713    Specimen: Blood from Arm, Left Updated: 01/11/24 1732    Narrative:      The following orders were created for panel order CBC & Differential.  Procedure                               Abnormality         Status                     ---------                               -----------         ------                     CBC Auto Differential[[id number]]        Abnormal            Final result                 Please view results for these tests on the individual orders.    Comprehensive Metabolic Panel [[id number]]  (Abnormal) Collected: 01/11/24  1713    Specimen: Blood from Arm, Left Updated: 01/11/24 1756     Glucose 101 mg/dL      BUN 19 mg/dL      Creatinine 0.99 mg/dL      Sodium 141 mmol/L      Potassium 4.0 mmol/L      Chloride 105 mmol/L      CO2 24.0 mmol/L      Calcium 9.6 mg/dL      Total Protein 7.2 g/dL      Albumin 4.6 g/dL      ALT (SGPT) 23 U/L      AST (SGOT) 19 U/L      Alkaline Phosphatase 64 U/L      Total Bilirubin 0.2 mg/dL      Globulin 2.6 gm/dL      A/G Ratio 1.8 g/dL      BUN/Creatinine Ratio 19.2     Anion Gap 12.0 mmol/L      eGFR 98.1 mL/min/1.73     Narrative:      GFR Normal >60  Chronic Kidney Disease <60  Kidney Failure <15      Lipase [[id number]]  (Normal) Collected: 01/11/24 1713    Specimen: Blood from Arm, Left Updated: 01/11/24 1756     Lipase 35 U/L     CBC Auto Differential [[id number]]  (Abnormal) Collected: 01/11/24 1713    Specimen: Blood from Arm, Left Updated: 01/11/24 1732     WBC 14.21 10*3/mm3      RBC 4.94 10*6/mm3      Hemoglobin 15.0 g/dL      Hematocrit 44.9 %      MCV 90.9 fL      MCH 30.4 pg      MCHC 33.4 g/dL      RDW 12.1 %      RDW-SD 39.9 fl      MPV 9.9 fL      Platelets 278 10*3/mm3      Neutrophil % 84.9 %      Lymphocyte % 7.7 %      Monocyte % 5.8 %      Eosinophil % 0.8 %      Basophil % 0.4 %      Immature Grans % 0.4 %      Neutrophils, Absolute 12.05 10*3/mm3      Lymphocytes, Absolute 1.10 10*3/mm3      Monocytes, Absolute 0.83 10*3/mm3      Eosinophils, Absolute 0.12 10*3/mm3      Basophils, Absolute 0.05 10*3/mm3      Immature Grans, Absolute 0.06 10*3/mm3      nRBC 0.0 /100 WBC              Imaging:    CT Abdomen Pelvis Without Contrast    Result Date: 1/11/2024  PROCEDURE: CT ABDOMEN PELVIS WO CONTRAST  COMPARISON: None  INDICATIONS: RIGHT FLANK PAIN, WORSE X TODAY. HISTORY OF KIDNEY STONES  TECHNIQUE: CT images were created without intravenous contrast.   PROTOCOL:   Standard imaging protocol performed    RADIATION:   DLP: 403.6 mGy*cm   Automated exposure control was utilized to  minimize radiation dose.  FINDINGS:    Lung bases:  Imaging of the lung bases is grossly clear.  No free air is noted below the diaphragm.  Organs:  Liver is decreased in attenuation compatible with hepatic steatosis with areas of probable focal fatty sparing.  Gallbladder is decompressed and otherwise grossly unremarkable.  4 mm calculus is noted at the right UPJ with mild obstructive uropathy.  Limited imaging of the right kidney is otherwise unremarkable in appearance.  Remainder of the visualized expected course of the right ureter is unremarkable.  Nonobstructing calculus are noted on the left.  Limited noncontrast imaging of the pancreas, spleen and adrenal glands are unremarkable  GI tract:  Limited noncontrast imaging of the stomach and small bowel demonstrate no acute abnormality.  Small lymph nodes within the mesentery are likely reactive.  Small fat containing umbilical hernia noted.  No bowel wall mint.  Ileocecal valve and appendix appear unremarkable.  Colon demonstrates no acute abnormality  Pelvis:  Urinary bladder, prostate and pelvic structures demonstrate no acute abnormality  Retroperitoneum:  Aorta is normal in caliber.  There is no suspicious retroperitoneal adenopathy  Bones and soft tissues:  No destructive bone lesion partial fusion of the left SI joint noted.  Degenerative changes noted at the L5-S1 level.        1. 4 mm calculus at the right UPJ with associated mild obstructive uropathy 2. Nonobstructing calculus left kidney 3. Hepatic steatosis.  Please correlate with liver function test     DINA TERRAZAS MD       Electronically Signed and Approved By: DINA TERRAZAS MD on 1/11/2024 at 18:39                Differential Diagnosis and Discussion:      Abdominal Pain: Based on the patient's signs and symptoms, I considered abdominal aortic aneurysm, small bowel obstruction, pancreatitis, acute cholecystitis, acute appendecitis, peptic ulcer disease, gastritis, colitis, endocrine  disorders, irritable bowel syndrome and other differential diagnosis an etiology of the patient's abdominal pain.    All labs were reviewed and interpreted by me.  CT scan radiology impression was interpreted by me.    MDM     Amount and/or Complexity of Data Reviewed  Clinical lab tests: reviewed  Tests in the radiology section of CPT®: reviewed                 Patient Care Considerations:    CONSULT: I considered consulting urology, however patient rates his pain at 1 out of 10 here in the emergency department.  He has no fever, persistent tachycardia or evidence of UTI to be concerned for infected stone.      Consultants/Shared Management Plan:    None    Social Determinants of Health:    Patient is independent, reliable, and has access to care.       Disposition and Care Coordination:    Discharged: I considered escalation of care by admitting this patient for observation, however the patient has improved and is suitable and  stable for discharge.    I have explained the patient´s condition, diagnoses and treatment plan based on the information available to me at this time. I have answered questions and addressed any concerns. The patient has a good  understanding of the patient´s diagnosis, condition, and treatment plan as can be expected at this point. The vital signs have been stable. The patient´s condition is stable and appropriate for discharge from the emergency department.      The patient will pursue further outpatient evaluation with the primary care physician or other designated or consulting physician as outlined in the discharge instructions. They are agreeable to this plan of care and follow-up instructions have been explained in detail. The patient has received these instructions in written format and have expressed an understanding of the discharge instructions. The patient is aware that any significant change in condition or worsening of symptoms should prompt an immediate return to this or the  closest emergency department or call to 911.    Final diagnoses:   Right ureteral stone        ED Disposition       ED Disposition   Discharge    Condition   Stable    Comment   --               This medical record created using voice recognition software.             Vern Muñoz MD  01/11/24 [id number]

## 2024-01-12 NOTE — DISCHARGE INSTRUCTIONS
Return to the emergency department immediately for uncontrolled pain, fever, worsening of symptoms.  Stay well-hydrated.  Do not take any Tylenol/ibuprofen or other anti-inflammatory pain medicines with the medication I have prescribed you.

## 2024-01-24 ENCOUNTER — OFFICE VISIT (OUTPATIENT)
Dept: UROLOGY | Facility: CLINIC | Age: 44
End: 2024-01-24
Payer: COMMERCIAL

## 2024-01-24 VITALS
DIASTOLIC BLOOD PRESSURE: 92 MMHG | WEIGHT: 163 LBS | HEIGHT: 67 IN | BODY MASS INDEX: 25.58 KG/M2 | SYSTOLIC BLOOD PRESSURE: 163 MMHG

## 2024-01-24 DIAGNOSIS — N20.0 KIDNEY STONE: Primary | ICD-10-CM

## 2024-01-24 LAB
BILIRUB BLD-MCNC: NEGATIVE MG/DL
CLARITY, POC: CLEAR
COLOR UR: YELLOW
EXPIRATION DATE: NORMAL
GLUCOSE UR STRIP-MCNC: NEGATIVE MG/DL
KETONES UR QL: NEGATIVE
LEUKOCYTE EST, POC: NEGATIVE
Lab: NORMAL
NITRITE UR-MCNC: NEGATIVE MG/ML
PH UR: 6 [PH] (ref 5–8)
PROT UR STRIP-MCNC: NEGATIVE MG/DL
RBC # UR STRIP: NEGATIVE /UL
SP GR UR: 1.03 (ref 1–1.03)
UROBILINOGEN UR QL: NORMAL

## 2024-01-24 NOTE — PROGRESS NOTES
"Chief Complaint  Flank Pain (4mm he thinks its disolved or gone its been 5 days )    Subjective          Theo Ching presents to [facility] UROLOGY    History of Present Illness  Patient was recently seen in the ER with a 4 mm proximal right ureteral stone.  He did pass that stone.  He does have 1 further stone in his left kidney is about the same size.  Patient states he has passed more stones recently over the last few years.  He is also interested in having a vasectomy.      Objective   Vital Signs:   /92 (BP Location: Left arm, Patient Position: Sitting, Cuff Size: Adult)   Ht 170.2 cm (67\")   Wt 73.9 kg (163 lb)   BMI 25.53 kg/m²       Physical Exam  Vitals and nursing note reviewed.   Constitutional:       Appearance: Normal appearance. He is well-developed.   Pulmonary:      Effort: Pulmonary effort is normal.      Breath sounds: Normal air entry.   Neurological:      Mental Status: He is alert and oriented to person, place, and time.      Motor: Motor function is intact.   Psychiatric:         Mood and Affect: Mood normal.         Behavior: Behavior normal.          Result Review :   The following data was reviewed by: Lynette Leslie MD on 01/24/2024:    Results for orders placed or performed in visit on 01/24/24   POC Urinalysis Dipstick, Automated    Specimen: Urine   Result Value Ref Range    Color Yellow Yellow, Straw, Dark Yellow, Valerie    Clarity, UA Clear Clear    Specific Gravity  1.030 1.005 - 1.030    pH, Urine 6.0 5.0 - 8.0    Leukocytes Negative Negative    Nitrite, UA Negative Negative    Protein, POC Negative Negative mg/dL    Glucose, UA Negative Negative mg/dL    Ketones, UA Negative Negative    Urobilinogen, UA 0.2 E.U./dL Normal, 0.2 E.U./dL    Bilirubin Negative Negative    Blood, UA Negative Negative    Lot Number [id number]     Expiration Date 12/30/24            Data reviewed : Radiologic studies CT scan:      Study Result    Narrative & Impression "   PROCEDURE:  CT ABDOMEN PELVIS WO CONTRAST     COMPARISON: None     INDICATIONS:  RIGHT FLANK PAIN, WORSE X TODAY. HISTORY OF KIDNEY STONES     TECHNIQUE:    CT images were created without intravenous contrast.       PROTOCOL:     Standard imaging protocol performed                 RADIATION:      DLP: 403.6 mGy*cm               Automated exposure control was utilized to minimize radiation dose.      FINDINGS:                Lung bases:  Imaging of the lung bases is grossly clear.  No free air is noted below the diaphragm.     Organs:  Liver is decreased in attenuation compatible with hepatic steatosis with areas of probable   focal fatty sparing.  Gallbladder is decompressed and otherwise grossly unremarkable.  4 mm   calculus is noted at the right UPJ with mild obstructive uropathy.  Limited imaging of the right   kidney is otherwise unremarkable in appearance.  Remainder of the visualized expected course of the   right ureter is unremarkable.  Nonobstructing calculus are noted on the left.  Limited noncontrast   imaging of the pancreas, spleen and adrenal glands are unremarkable     GI tract:  Limited noncontrast imaging of the stomach and small bowel demonstrate no acute   abnormality.  Small lymph nodes within the mesentery are likely reactive.  Small fat containing   umbilical hernia noted.  No bowel wall mint.  Ileocecal valve and appendix appear unremarkable.    Colon demonstrates no acute abnormality     Pelvis:  Urinary bladder, prostate and pelvic structures demonstrate no acute abnormality     Retroperitoneum:  Aorta is normal in caliber.  There is no suspicious retroperitoneal adenopathy     Bones and soft tissues:  No destructive bone lesion partial fusion of the left SI joint noted.    Degenerative changes noted at the L5-S1 level.     IMPRESSION:                 1. 4 mm calculus at the right UPJ with associated mild obstructive uropathy  2. Nonobstructing calculus left kidney  3. Hepatic steatosis.   Please correlate with liver function test            DINA TERRAZAS MD         Electronically Signed and Approved By: DINA TERRAZAS MD on 1/11/2024 at 18:39               Assessment and Plan    Diagnoses and all orders for this visit:    1. Kidney stone (Primary)  -     POC Urinalysis Dipstick, Automated  -     XR Abdomen KUB; Future    He will follow back up in 1 year with a KUB prior.  In addition he probably wants a vasectomy and he will get scheduled with Dr. Jonas with Dr. Azar for consult for that.        Follow Up       No follow-ups on file.  Patient was given instructions and counseling regarding his condition or for health maintenance advice. Please see specific information pulled into the AVS if appropriate.

## 2024-02-21 ENCOUNTER — OFFICE VISIT (OUTPATIENT)
Dept: CARDIOLOGY | Facility: CLINIC | Age: 44
End: 2024-02-21
Payer: COMMERCIAL

## 2024-02-21 VITALS
BODY MASS INDEX: 24.96 KG/M2 | HEART RATE: 98 BPM | DIASTOLIC BLOOD PRESSURE: 87 MMHG | WEIGHT: 159 LBS | HEIGHT: 67 IN | SYSTOLIC BLOOD PRESSURE: 124 MMHG

## 2024-02-21 DIAGNOSIS — R03.0 BORDERLINE HIGH BLOOD PRESSURE: ICD-10-CM

## 2024-02-21 DIAGNOSIS — I34.1 MITRAL VALVE PROLAPSE: Primary | ICD-10-CM

## 2024-02-21 PROCEDURE — 93000 ELECTROCARDIOGRAM COMPLETE: CPT | Performed by: INTERNAL MEDICINE

## 2024-02-21 PROCEDURE — 99203 OFFICE O/P NEW LOW 30 MIN: CPT | Performed by: INTERNAL MEDICINE

## 2024-02-21 NOTE — ASSESSMENT & PLAN NOTE
High blood pressure was noted during recent ER visit and also during visit to urologist.  He was having significant flank pain during both of those occasions.  Blood pressure is reasonably well-controlled in the office today.  EKG has no evidence of hypertensive heart disease.  We discussed regarding low-sodium diet and regular exercise.  Encouraged to keep home blood pressure log.  No current indication for pharmacological agents.  Echocardiogram will be performed to evaluate LV function.

## 2024-02-21 NOTE — PROGRESS NOTES
CARDIOLOGY INITIAL CONSULT       Chief Complaint  Establish Care and Hypertension    Subjective            Theo Ching presents to [facility] CARDIOLOGY  History of Present Illness    This is a 43-year-old male with no significant previous medical problems.  He was told to have mitral prolapse in his teenage.  Recently during visit to emergency room and to urologist for flank pain, he was noted to have high blood pressure.  He was in significant pain during those visits.  Since then, he passed a stone and currently pain-free.  He denies any chest pain, shortness of breath, palpitations or dizziness.  He is fairly active at baseline.  His last echocardiogram was more than 20 years back.       Past History:    Medical History:  Past Medical History:   Diagnosis Date    Allergies        Surgical History: has a past surgical history that includes Mouth surgery.     Family History: family history includes Cancer in his mother; Diabetes in his mother; Hyperlipidemia in his father; Hypertension in his mother.     Social History: reports that he has been smoking cigars. He has never used smokeless tobacco. He reports current alcohol use of about 8.0 standard drinks of alcohol per week. He reports that he does not use drugs.    Allergies: Patient has no known allergies.    Current Outpatient Medications on File Prior to Visit   Medication Sig    cetirizine (zyrTEC) 10 MG tablet Take 1 tablet by mouth Daily.         Review of Systems   Constitutional:  Negative for fatigue, unexpected weight gain and unexpected weight loss.   Eyes:  Negative for double vision.   Respiratory:  Negative for cough, shortness of breath and wheezing.    Cardiovascular:  Negative for chest pain, palpitations and leg swelling.   Gastrointestinal:  Negative for abdominal pain, nausea and vomiting.   Endocrine: Negative for cold intolerance, heat intolerance, polydipsia and polyuria.   Genitourinary:  Positive for flank pain  "(Currently subsided).   Musculoskeletal:  Negative for arthralgias and back pain.   Skin:  Negative for color change.   Neurological:  Negative for dizziness, syncope, weakness and headache.   Hematological:  Does not bruise/bleed easily.        Objective     /87   Pulse 98   Ht 170.2 cm (67\")   Wt 72.1 kg (159 lb)   BMI 24.90 kg/m²       Physical Exam  Constitutional:       General: He is awake. He is not in acute distress.     Appearance: Normal appearance.   Eyes:      Extraocular Movements: Extraocular movements intact.      Pupils: Pupils are equal, round, and reactive to light.   Neck:      Thyroid: No thyromegaly.      Vascular: No carotid bruit or JVD.   Cardiovascular:      Rate and Rhythm: Normal rate and regular rhythm.      Chest Wall: PMI is not displaced.      Heart sounds: Normal heart sounds, S1 normal and S2 normal. No murmur heard.     No friction rub. No gallop. No S3 or S4 sounds.   Pulmonary:      Effort: Pulmonary effort is normal. No respiratory distress.      Breath sounds: Normal breath sounds. No wheezing, rhonchi or rales.   Abdominal:      General: Bowel sounds are normal.      Palpations: Abdomen is soft.      Tenderness: There is no abdominal tenderness.   Musculoskeletal:      Cervical back: Neck supple.      Right lower leg: No edema.      Left lower leg: No edema.   Skin:     Nails: There is no clubbing.   Neurological:      General: No focal deficit present.      Mental Status: He is alert and oriented to person, place, and time.           Result Review :     The following data was reviewed by: Gumaro Tomlinson MD on 02/21/2024:    CMP          1/11/2024    17:13   CMP   Glucose 101    BUN 19    Creatinine 0.99    EGFR 98.1    Sodium 141    Potassium 4.0    Chloride 105    Calcium 9.6    Total Protein 7.2    Albumin 4.6    Globulin 2.6    Total Bilirubin 0.2    Alkaline Phosphatase 64    AST (SGOT) 19    ALT (SGPT) 23    Albumin/Globulin Ratio 1.8    BUN/Creatinine Ratio " 19.2    Anion Gap 12.0      CBC          1/11/2024    17:13   CBC   WBC 14.21    RBC 4.94    Hemoglobin 15.0    Hematocrit 44.9    MCV 90.9    MCH 30.4    MCHC 33.4    RDW 12.1    Platelets 278             Data reviewed: Cardiology studies              ECG 12 Lead    Date/Time: 2/21/2024 9:51 AM  Performed by: Gumaro Tomlinson MD    Authorized by: Gumaro Tomlinson MD  Comparison: not compared with previous ECG   Previous ECG: no previous ECG available  Rhythm: sinus rhythm  Rate: normal  Conduction: conduction normal  ST Segments: ST segments normal  T Waves: T waves normal  QRS axis: normal  Other: no other findings    Clinical impression: normal ECG              Assessment and Plan        Diagnoses and all orders for this visit:    1. Mitral valve prolapse (Primary)  Assessment & Plan:  Reported mitral valve prolapse in childhood.  No recent echocardiograms available.  He has no significant symptoms.  We will proceed with echocardiogram to evaluate LV function and rule out significant valvular abnormalities.    Orders:  -     Adult Transthoracic Echo Complete W/ Cont if Necessary Per Protocol; Future    2. Borderline high blood pressure  Assessment & Plan:  High blood pressure was noted during recent ER visit and also during visit to urologist.  He was having significant flank pain during both of those occasions.  Blood pressure is reasonably well-controlled in the office today.  EKG has no evidence of hypertensive heart disease.  We discussed regarding low-sodium diet and regular exercise.  Encouraged to keep home blood pressure log.  No current indication for pharmacological agents.  Echocardiogram will be performed to evaluate LV function.      Other orders  -     ECG 12 Lead        I spent 16 minutes caring for Theo on this date of service. This time includes time spent by me in the following activities:reviewing tests, obtaining and/or reviewing a separately obtained history, performing a medically  appropriate examination and/or evaluation , ordering medications, tests, or procedures, and documenting information in the medical record    Follow Up     Return in about 6 months (around 8/21/2024) for Next scheduled follow up.    Patient was given instructions and counseling regarding his condition or for health maintenance advice. Please see specific information pulled into the AVS if appropriate.

## 2024-02-21 NOTE — ASSESSMENT & PLAN NOTE
Reported mitral valve prolapse in childhood.  No recent echocardiograms available.  He has no significant symptoms.  We will proceed with echocardiogram to evaluate LV function and rule out significant valvular abnormalities.

## 2025-01-23 ENCOUNTER — TELEPHONE (OUTPATIENT)
Dept: UROLOGY | Age: 45
End: 2025-01-23

## 2025-01-23 DIAGNOSIS — N20.0 KIDNEY STONE: Primary | ICD-10-CM

## 2025-01-23 NOTE — TELEPHONE ENCOUNTER
CALLED PT. [phone] RE: INS. VERIF. FOR UPCOMING F/U APPT W/DR. VENCES 01/27/. LFT. MSG. ON  FOR PT. TO CALL OFFICE PRIOR TO APPT.  RMHANNA 01/23/25

## 2025-02-03 ENCOUNTER — TELEPHONE (OUTPATIENT)
Dept: UROLOGY | Age: 45
End: 2025-02-03

## 2025-03-24 PROBLEM — Z30.09 STERILIZATION CONSULT: Status: ACTIVE | Noted: 2025-03-24

## 2025-03-24 NOTE — PROGRESS NOTES
Chief Complaint: Undesired fertility         History of Present Illness:  Theo Ching is a 44 y.o. male presents for counseling regarding vasectomy for permanent sterilization. The procedure was discussed with the patient. Theo Ching is aware that the procedure should be considered irreversible, although at times it can be reversed with success. Risks for the procedure including local effects of swelling, bleeding, pain, the possibility for recanalization, and continued fertility is also possible. Formation of a sperm granuloma also is a possibility. We discussed the procedure, preoperative preparation, and postoperative care. We also discussed the importance of continuing to use contraception post vasectomy, since the patient will not be sterile at that point. We stressed the importance of continuing to use contraception until a follow-up semen specimen is done that shows the absence of sperm. That specimen will normally be obtained eight weeks post-surgery. Theo Ching is voluntarily requesting the procedure and understands that if it is successful he will be unable to father children.     No anticoagulation, no previous scrotal surgery, no cardiopulmonary history    Alert and oriented x3  No acute distress  Unlabored respirations  Nontender/nondistended  Normal circumcised phallus, bilateral descended testicles without mass.  Bilateral vas deferens palpable  Grossly oriented to person place and time judgment is intact      Assessment and Plan      Consult for sterilization      Plan    Instructions  The patient will schedule a vasectomy if he desires.   Handouts were provided, vasectomy  scanned into the EMR for reference.   Vasectomy is intended to be a permanent form of contraception.   Vasectomy does not produce immediate sterility.   Following vasectomy, another form of contraception is required until vas occlusion is confirmed by post-vasectomy semen analysis (PVSA).   Even after vas occlusion is  confirmed, vasectomy is not 100% reliable in preventing pregnancy.   The risk of pregnancy after vasectomy is approximately 1 in 2,000 for men who have no sperm in the semen on post-vasectomy semen analysis showing rare non-motile sperm (RNMS).   Repeat vasectomy is necessary in <1% of vasectomies, provided that a technique for vas occlusion known to have low occlusive failure rate has been used.   Patient's should refrain from ejaculation for approximately 1 week after vasectomy.   Options for fertility after vasectomy reversal and sperm retrieval with in vitro fertilization. These options are not always successful, and are very expensive.   The rates of surgical complications such as symptomatic hematoma and infection are 1-2%  Chronic scrotal pain associated with negative impact on quality of life occurs after vasectomy in about 1-2% of men. Few of these men require additional surgery.   Other permanent and non-permanent alternatives to vasectomy are available.  Patient voiced understanding of the above statements.   Electronically identified patient education materials provided electronically    Patient has decided to schedule a vasectomy.

## 2025-03-28 ENCOUNTER — OFFICE VISIT (OUTPATIENT)
Dept: UROLOGY | Age: 45
End: 2025-03-28
Payer: COMMERCIAL

## 2025-03-28 VITALS — BODY MASS INDEX: 26.74 KG/M2 | HEIGHT: 67 IN | WEIGHT: 170.4 LBS

## 2025-03-28 DIAGNOSIS — Z30.09 STERILIZATION CONSULT: Primary | ICD-10-CM

## 2025-03-28 RX ORDER — LEVOCETIRIZINE DIHYDROCHLORIDE 5 MG/1
5 TABLET, FILM COATED ORAL EVERY EVENING
COMMUNITY

## 2025-07-18 ENCOUNTER — PROCEDURE VISIT (OUTPATIENT)
Dept: UROLOGY | Age: 45
End: 2025-07-18
Payer: COMMERCIAL

## 2025-07-18 DIAGNOSIS — Z30.2 ENCOUNTER FOR STERILIZATION: Primary | ICD-10-CM

## 2025-07-18 NOTE — PROGRESS NOTES
Vasectomy Procedure    Procedure: Vasectomy     Pre-procedure Diagnosis: Undesired Fertility    Post-procedure Diagnosis: Same    Surgeon: Santy Jonas MD    Anesthesia: Local, 10 cc of 1% Lidocaine    Indications  with undesired fertility, who presents today for vasectomy for permanent contraception.     Procedure Details:     The patient was appropriately identified, brought into the procedure room, and placed supine on the procedure table. A time was undertaken documenting the correct patient, site and procedure. His scrotum was prepped and draped in the standard sterile fashion. We first approached the right vas deferens. This was identified,  from the spermatic cord structures, and brought to the anterolateral aspect of the hemiscrotum. The local anaesthetic solution was injected and once an adequate level of local anesthesia was achieved, a scalpel was used to make a 1 cm incision in the skin overlying the vas deferens at the midline. A sharp hemostat was used to dissect the level of the vas deferens and on both of its sides, allowing for ring forceps to be introduced and grasp the vas deferens and externalize it through the skin incision. We then used a combination of sharp and blunt dissection to release the exposed vasal segment from all surrounding tissues. An approximately 1 cm piece of vas deferens was then sharply excised and removed. One blade of a sharp hemostat was used to probe each end of the severed vas deferens, confirming the presence of a vasal lumen. Electrocautery was then used to fulgurate both cut surfaces of the severed vas deferens. The abdominal side of the vas deferens was then placed underneath some perivasal tissues that were closed above it, using a figure-of-eight 3-0 chromic stitch, thus placing the two edges of the severed vas deferens in different tissue planes. Hemostasis was confirmed and the severed vas deferens was allowed to drop back into the scrotum.  We then  turned our attention to the left vas deferens. This was also identified and brought under the same midline incision. Local anesthetic was then delivered on this side around the vas deferens. An identical procedure was then carried out on the left vas deferens. Wound was dressed with bacitracin ointment and folded 4x4 gauze. The patient tolerated the procedure well and there were no intraoperative or immediate postoperative complications.     No intraprocedural complications    Blood loss 000    Plan:    1. Continue contraception until negative sperm analysis. Semen count in 10 weeks  2. Warning signs of infection were reviewed.   3. Patient is taken home by  with written home care instructions.  Bedrest X 48 hrs, Ice pack every 3 hours for 24 hrs.    Call the clinic if excessive pain, bleeding or swelling.  Light duty for 2 weeks    Patient voiced understanding.    Electronically Signed by Santy Jonas MD on 07/18/2025